# Patient Record
Sex: MALE | Race: WHITE | Employment: FULL TIME | URBAN - METROPOLITAN AREA
[De-identification: names, ages, dates, MRNs, and addresses within clinical notes are randomized per-mention and may not be internally consistent; named-entity substitution may affect disease eponyms.]

---

## 2023-01-28 ENCOUNTER — APPOINTMENT (OUTPATIENT)
Dept: GENERAL RADIOLOGY | Age: 38
End: 2023-01-28

## 2023-01-28 ENCOUNTER — APPOINTMENT (OUTPATIENT)
Dept: NON INVASIVE DIAGNOSTICS | Age: 38
DRG: 247 | End: 2023-01-28

## 2023-01-28 ENCOUNTER — HOSPITAL ENCOUNTER (EMERGENCY)
Age: 38
Discharge: ANOTHER HOSPITAL | End: 2023-01-28

## 2023-01-28 ENCOUNTER — HOSPITAL ENCOUNTER (INPATIENT)
Age: 38
LOS: 2 days | Discharge: HOME | DRG: 247 | End: 2023-01-30
Attending: INTERNAL MEDICINE | Admitting: INTERNAL MEDICINE

## 2023-01-28 VITALS
BODY MASS INDEX: 34.07 KG/M2 | WEIGHT: 230 LBS | DIASTOLIC BLOOD PRESSURE: 133 MMHG | RESPIRATION RATE: 11 BRPM | OXYGEN SATURATION: 98 % | HEIGHT: 69 IN | SYSTOLIC BLOOD PRESSURE: 189 MMHG | TEMPERATURE: 97.7 F | HEART RATE: 72 BPM

## 2023-01-28 DIAGNOSIS — I25.119 CORONARY ARTERY DISEASE INVOLVING NATIVE CORONARY ARTERY OF NATIVE HEART WITH ANGINA PECTORIS (HCC): ICD-10-CM

## 2023-01-28 DIAGNOSIS — I16.0 HYPERTENSIVE URGENCY: ICD-10-CM

## 2023-01-28 DIAGNOSIS — I21.3 STEMI (ST ELEVATION MYOCARDIAL INFARCTION) (HCC): ICD-10-CM

## 2023-01-28 DIAGNOSIS — I21.11 ST ELEVATION MYOCARDIAL INFARCTION INVOLVING RIGHT CORONARY ARTERY (HCC): Primary | ICD-10-CM

## 2023-01-28 DIAGNOSIS — E78.5 HYPERLIPIDEMIA, UNSPECIFIED HYPERLIPIDEMIA TYPE: ICD-10-CM

## 2023-01-28 DIAGNOSIS — I10 HYPERTENSION, UNSPECIFIED TYPE: Chronic | ICD-10-CM

## 2023-01-28 DIAGNOSIS — R10.31 RIGHT INGUINAL PAIN: Primary | ICD-10-CM

## 2023-01-28 PROBLEM — Z72.0 TOBACCO ABUSE: Chronic | Status: ACTIVE | Noted: 2023-01-28

## 2023-01-28 PROBLEM — I21.21 STEMI INVOLVING LEFT CIRCUMFLEX CORONARY ARTERY (HCC): Status: ACTIVE | Noted: 2023-01-28

## 2023-01-28 PROBLEM — I24.9 ACS (ACUTE CORONARY SYNDROME) (HCC): Status: ACTIVE | Noted: 2023-01-28

## 2023-01-28 LAB
ACT BLD: 383 SECS (ref 70–128)
ALBUMIN SERPL-MCNC: 4.2 G/DL (ref 3.5–5)
ALBUMIN/GLOB SERPL: 1.1 (ref 0.4–1.6)
ALP SERPL-CCNC: 90 U/L (ref 50–136)
ALT SERPL-CCNC: 78 U/L (ref 12–65)
ANION GAP SERPL CALC-SCNC: 6 MMOL/L (ref 2–11)
AST SERPL-CCNC: 31 U/L (ref 15–37)
BILIRUB SERPL-MCNC: 0.5 MG/DL (ref 0.2–1.1)
BUN SERPL-MCNC: 15 MG/DL (ref 6–23)
CALCIUM SERPL-MCNC: 9.9 MG/DL (ref 8.3–10.4)
CHLORIDE SERPL-SCNC: 102 MMOL/L (ref 101–110)
CHOLEST SERPL-MCNC: 203 MG/DL
CO2 SERPL-SCNC: 29 MMOL/L (ref 21–32)
CREAT SERPL-MCNC: 0.96 MG/DL (ref 0.8–1.5)
ECHO AO ASC DIAM: 2.8 CM
ECHO AO ASCENDING AORTA INDEX: 1.28 CM/M2
ECHO AO ROOT DIAM: 3.3 CM
ECHO AO ROOT INDEX: 1.51 CM/M2
ECHO AV AREA PEAK VELOCITY: 2.7 CM2
ECHO AV AREA VTI: 2.7 CM2
ECHO AV AREA/BSA PEAK VELOCITY: 1.2 CM2/M2
ECHO AV AREA/BSA VTI: 1.2 CM2/M2
ECHO AV MEAN GRADIENT: 4 MMHG
ECHO AV MEAN GRADIENT: 4 MMHG
ECHO AV MEAN VELOCITY: 1 M/S
ECHO AV PEAK GRADIENT: 7 MMHG
ECHO AV PEAK VELOCITY: 1.4 M/S
ECHO AV VELOCITY RATIO: 0.86
ECHO AV VTI: 29.4 CM
ECHO BSA: 2.25 M2
ECHO BSA: 2.25 M2
ECHO EST RA PRESSURE: 3 MMHG
ECHO IVC PROX: 2 CM
ECHO LA AREA 2C: 16.8 CM2
ECHO LA AREA 4C: 16.2 CM2
ECHO LA DIAMETER INDEX: 2.01 CM/M2
ECHO LA DIAMETER: 4.4 CM
ECHO LA MAJOR AXIS: 5.6 CM
ECHO LA MINOR AXIS: 5.8 CM
ECHO LA TO AORTIC ROOT RATIO: 1.33
ECHO LA VOL 2C: 39 ML (ref 18–58)
ECHO LA VOL 4C: 39 ML (ref 18–58)
ECHO LA VOL BP: 40 ML (ref 18–58)
ECHO LA VOL/BSA BIPLANE: 18 ML/M2 (ref 16–34)
ECHO LA VOLUME INDEX A2C: 18 ML/M2 (ref 16–34)
ECHO LA VOLUME INDEX A4C: 18 ML/M2 (ref 16–34)
ECHO LV E' LATERAL VELOCITY: 12 CM/S
ECHO LV E' SEPTAL VELOCITY: 11 CM/S
ECHO LV EDV A2C: 82 ML
ECHO LV EDV A4C: 80 ML
ECHO LV EDV INDEX A4C: 37 ML/M2
ECHO LV EDV NDEX A2C: 37 ML/M2
ECHO LV EJECTION FRACTION A2C: 46 %
ECHO LV EJECTION FRACTION A4C: 46 %
ECHO LV EJECTION FRACTION BIPLANE: 46 % (ref 55–100)
ECHO LV ESV A2C: 45 ML
ECHO LV ESV A4C: 43 ML
ECHO LV ESV INDEX A2C: 21 ML/M2
ECHO LV ESV INDEX A4C: 20 ML/M2
ECHO LV FRACTIONAL SHORTENING: 51 % (ref 28–44)
ECHO LV INTERNAL DIMENSION DIASTOLE INDEX: 2.33 CM/M2
ECHO LV INTERNAL DIMENSION DIASTOLIC: 5.1 CM (ref 4.2–5.9)
ECHO LV INTERNAL DIMENSION SYSTOLIC INDEX: 1.14 CM/M2
ECHO LV INTERNAL DIMENSION SYSTOLIC: 2.5 CM
ECHO LV IVSD: 1.1 CM (ref 0.6–1)
ECHO LV MASS 2D: 213.9 G (ref 88–224)
ECHO LV MASS INDEX 2D: 97.7 G/M2 (ref 49–115)
ECHO LV POSTERIOR WALL DIASTOLIC: 1.1 CM (ref 0.6–1)
ECHO LV RELATIVE WALL THICKNESS RATIO: 0.43
ECHO LVOT AREA: 3.1 CM2
ECHO LVOT AV VTI INDEX: 0.87
ECHO LVOT DIAM: 2 CM
ECHO LVOT MEAN GRADIENT: 3 MMHG
ECHO LVOT PEAK GRADIENT: 5 MMHG
ECHO LVOT PEAK VELOCITY: 1.2 M/S
ECHO LVOT STROKE VOLUME INDEX: 36.7 ML/M2
ECHO LVOT SV: 80.4 ML
ECHO LVOT VTI: 25.6 CM
ECHO MV A VELOCITY: 0.66 M/S
ECHO MV E DECELERATION TIME (DT): 178 MS
ECHO MV E VELOCITY: 0.85 M/S
ECHO MV E/A RATIO: 1.29
ECHO MV E/E' LATERAL: 7.08
ECHO MV E/E' RATIO (AVERAGED): 7.41
ECHO MV E/E' SEPTAL: 7.73
ECHO PV MAX VELOCITY: 0.9 M/S
ECHO PV PEAK GRADIENT: 3 MMHG
ECHO RIGHT VENTRICULAR SYSTOLIC PRESSURE (RVSP): 12 MMHG
ECHO RV BASAL DIMENSION: 3.9 CM
ECHO RV FREE WALL PEAK S': 13 CM/S
ECHO RV TAPSE: 2.5 CM (ref 1.7–?)
ECHO TV REGURGITANT MAX VELOCITY: 1.47 M/S
ECHO TV REGURGITANT PEAK GRADIENT: 9 MMHG
EKG ATRIAL RATE: 81 BPM
EKG DIAGNOSIS: NORMAL
EKG P AXIS: 59 DEGREES
EKG P-R INTERVAL: 160 MS
EKG Q-T INTERVAL: 368 MS
EKG QRS DURATION: 90 MS
EKG QTC CALCULATION (BAZETT): 427 MS
EKG R AXIS: 71 DEGREES
EKG T AXIS: 85 DEGREES
EKG VENTRICULAR RATE: 81 BPM
ERYTHROCYTE [DISTWIDTH] IN BLOOD BY AUTOMATED COUNT: 12.7 % (ref 11.9–14.6)
EST. AVERAGE GLUCOSE BLD GHB EST-MCNC: 100 MG/DL
GLOBULIN SER CALC-MCNC: 3.9 G/DL (ref 2.8–4.5)
GLUCOSE SERPL-MCNC: 111 MG/DL (ref 65–100)
HBA1C MFR BLD: 5.1 % (ref 4.8–5.6)
HCT VFR BLD AUTO: 51 % (ref 41.1–50.3)
HDLC SERPL-MCNC: 28 MG/DL (ref 40–60)
HDLC SERPL: 7.3
HGB BLD-MCNC: 17.4 G/DL (ref 13.6–17.2)
LDLC SERPL CALC-MCNC: 134.2 MG/DL
LV EF: 53 %
LVEF MODALITY: ABNORMAL
MAGNESIUM SERPL-MCNC: 2.3 MG/DL (ref 1.8–2.4)
MCH RBC QN AUTO: 29.5 PG (ref 26.1–32.9)
MCHC RBC AUTO-ENTMCNC: 34.1 G/DL (ref 31.4–35)
MCV RBC AUTO: 86.6 FL (ref 82–102)
NRBC # BLD: 0 K/UL (ref 0–0.2)
PLATELET # BLD AUTO: 241 K/UL (ref 150–450)
PMV BLD AUTO: 9.7 FL (ref 9.4–12.3)
POTASSIUM SERPL-SCNC: 3.4 MMOL/L (ref 3.5–5.1)
PROT SERPL-MCNC: 8.1 G/DL (ref 6.3–8.2)
RBC # BLD AUTO: 5.89 M/UL (ref 4.23–5.6)
SODIUM SERPL-SCNC: 137 MMOL/L (ref 133–143)
TRIGL SERPL-MCNC: 204 MG/DL (ref 35–150)
TROPONIN I SERPL HS-MCNC: 1428.9 PG/ML (ref 0–14)
TROPONIN I SERPL HS-MCNC: 25.9 PG/ML (ref 0–14)
TROPONIN I SERPL HS-MCNC: 5339.7 PG/ML (ref 0–14)
VLDLC SERPL CALC-MCNC: 40.8 MG/DL (ref 6–23)
WBC # BLD AUTO: 13.3 K/UL (ref 4.3–11.1)

## 2023-01-28 PROCEDURE — 6370000000 HC RX 637 (ALT 250 FOR IP): Performed by: INTERNAL MEDICINE

## 2023-01-28 PROCEDURE — 85347 COAGULATION TIME ACTIVATED: CPT

## 2023-01-28 PROCEDURE — 80061 LIPID PANEL: CPT

## 2023-01-28 PROCEDURE — C1887 CATHETER, GUIDING: HCPCS | Performed by: INTERNAL MEDICINE

## 2023-01-28 PROCEDURE — C9606 PERC D-E COR REVASC W AMI S: HCPCS | Performed by: INTERNAL MEDICINE

## 2023-01-28 PROCEDURE — 027035Z DILATION OF CORONARY ARTERY, ONE ARTERY WITH TWO DRUG-ELUTING INTRALUMINAL DEVICES, PERCUTANEOUS APPROACH: ICD-10-PCS | Performed by: INTERNAL MEDICINE

## 2023-01-28 PROCEDURE — 99285 EMERGENCY DEPT VISIT HI MDM: CPT

## 2023-01-28 PROCEDURE — 2100000000 HC CCU R&B

## 2023-01-28 PROCEDURE — 99222 1ST HOSP IP/OBS MODERATE 55: CPT | Performed by: INTERNAL MEDICINE

## 2023-01-28 PROCEDURE — 6360000002 HC RX W HCPCS: Performed by: INTERNAL MEDICINE

## 2023-01-28 PROCEDURE — 99152 MOD SED SAME PHYS/QHP 5/>YRS: CPT | Performed by: INTERNAL MEDICINE

## 2023-01-28 PROCEDURE — 96375 TX/PRO/DX INJ NEW DRUG ADDON: CPT

## 2023-01-28 PROCEDURE — 2709999900 HC NON-CHARGEABLE SUPPLY: Performed by: INTERNAL MEDICINE

## 2023-01-28 PROCEDURE — 93458 L HRT ARTERY/VENTRICLE ANGIO: CPT | Performed by: INTERNAL MEDICINE

## 2023-01-28 PROCEDURE — 6370000000 HC RX 637 (ALT 250 FOR IP)

## 2023-01-28 PROCEDURE — 6370000000 HC RX 637 (ALT 250 FOR IP): Performed by: NURSE PRACTITIONER

## 2023-01-28 PROCEDURE — 6360000002 HC RX W HCPCS: Performed by: NURSE PRACTITIONER

## 2023-01-28 PROCEDURE — C1760 CLOSURE DEV, VASC: HCPCS | Performed by: INTERNAL MEDICINE

## 2023-01-28 PROCEDURE — C1769 GUIDE WIRE: HCPCS | Performed by: INTERNAL MEDICINE

## 2023-01-28 PROCEDURE — 84484 ASSAY OF TROPONIN QUANT: CPT

## 2023-01-28 PROCEDURE — 4500000002 HC ER NO CHARGE

## 2023-01-28 PROCEDURE — 83735 ASSAY OF MAGNESIUM: CPT

## 2023-01-28 PROCEDURE — B2111ZZ FLUOROSCOPY OF MULTIPLE CORONARY ARTERIES USING LOW OSMOLAR CONTRAST: ICD-10-PCS | Performed by: INTERNAL MEDICINE

## 2023-01-28 PROCEDURE — 2580000003 HC RX 258: Performed by: INTERNAL MEDICINE

## 2023-01-28 PROCEDURE — 93005 ELECTROCARDIOGRAM TRACING: CPT | Performed by: NURSE PRACTITIONER

## 2023-01-28 PROCEDURE — 85027 COMPLETE CBC AUTOMATED: CPT

## 2023-01-28 PROCEDURE — C1874 STENT, COATED/COV W/DEL SYS: HCPCS | Performed by: INTERNAL MEDICINE

## 2023-01-28 PROCEDURE — 71045 X-RAY EXAM CHEST 1 VIEW: CPT

## 2023-01-28 PROCEDURE — 94760 N-INVAS EAR/PLS OXIMETRY 1: CPT

## 2023-01-28 PROCEDURE — 96374 THER/PROPH/DIAG INJ IV PUSH: CPT

## 2023-01-28 PROCEDURE — 6360000002 HC RX W HCPCS

## 2023-01-28 PROCEDURE — 2500000003 HC RX 250 WO HCPCS: Performed by: INTERNAL MEDICINE

## 2023-01-28 PROCEDURE — 80053 COMPREHEN METABOLIC PANEL: CPT

## 2023-01-28 PROCEDURE — 2580000003 HC RX 258: Performed by: NURSE PRACTITIONER

## 2023-01-28 PROCEDURE — 99153 MOD SED SAME PHYS/QHP EA: CPT | Performed by: INTERNAL MEDICINE

## 2023-01-28 PROCEDURE — 6360000004 HC RX CONTRAST MEDICATION: Performed by: INTERNAL MEDICINE

## 2023-01-28 PROCEDURE — C1894 INTRO/SHEATH, NON-LASER: HCPCS | Performed by: INTERNAL MEDICINE

## 2023-01-28 PROCEDURE — 93306 TTE W/DOPPLER COMPLETE: CPT | Performed by: INTERNAL MEDICINE

## 2023-01-28 PROCEDURE — 93306 TTE W/DOPPLER COMPLETE: CPT

## 2023-01-28 PROCEDURE — 83036 HEMOGLOBIN GLYCOSYLATED A1C: CPT

## 2023-01-28 PROCEDURE — 36415 COLL VENOUS BLD VENIPUNCTURE: CPT

## 2023-01-28 PROCEDURE — C1725 CATH, TRANSLUMIN NON-LASER: HCPCS | Performed by: INTERNAL MEDICINE

## 2023-01-28 PROCEDURE — 92941 PRQ TRLML REVSC TOT OCCL AMI: CPT | Performed by: INTERNAL MEDICINE

## 2023-01-28 DEVICE — IMPLANTABLE DEVICE: Type: IMPLANTABLE DEVICE | Status: FUNCTIONAL

## 2023-01-28 RX ORDER — ONDANSETRON 2 MG/ML
4 INJECTION INTRAMUSCULAR; INTRAVENOUS
Status: DISCONTINUED | OUTPATIENT
Start: 2023-01-28 | End: 2023-01-28 | Stop reason: HOSPADM

## 2023-01-28 RX ORDER — MAGNESIUM HYDROXIDE/ALUMINUM HYDROXICE/SIMETHICONE 120; 1200; 1200 MG/30ML; MG/30ML; MG/30ML
30 SUSPENSION ORAL EVERY 6 HOURS PRN
Status: DISCONTINUED | OUTPATIENT
Start: 2023-01-28 | End: 2023-01-30 | Stop reason: HOSPADM

## 2023-01-28 RX ORDER — HYDRALAZINE HYDROCHLORIDE 20 MG/ML
10 INJECTION INTRAMUSCULAR; INTRAVENOUS EVERY 6 HOURS PRN
Status: DISCONTINUED | OUTPATIENT
Start: 2023-01-28 | End: 2023-01-29

## 2023-01-28 RX ORDER — MORPHINE SULFATE 4 MG/ML
INJECTION, SOLUTION INTRAMUSCULAR; INTRAVENOUS
Status: COMPLETED
Start: 2023-01-28 | End: 2023-01-28

## 2023-01-28 RX ORDER — MORPHINE SULFATE 4 MG/ML
4 INJECTION, SOLUTION INTRAMUSCULAR; INTRAVENOUS
Status: DISCONTINUED | OUTPATIENT
Start: 2023-01-28 | End: 2023-01-30 | Stop reason: HOSPADM

## 2023-01-28 RX ORDER — ONDANSETRON HYDROCHLORIDE 4 MG/5ML
SOLUTION ORAL
Status: COMPLETED
Start: 2023-01-28 | End: 2023-01-28

## 2023-01-28 RX ORDER — NICOTINE 21 MG/24HR
1 PATCH, TRANSDERMAL 24 HOURS TRANSDERMAL DAILY
Status: DISCONTINUED | OUTPATIENT
Start: 2023-01-28 | End: 2023-01-30 | Stop reason: HOSPADM

## 2023-01-28 RX ORDER — HEPARIN SODIUM 1000 [USP'U]/ML
4000 INJECTION, SOLUTION INTRAVENOUS; SUBCUTANEOUS ONCE
Status: COMPLETED | OUTPATIENT
Start: 2023-01-28 | End: 2023-01-28

## 2023-01-28 RX ORDER — ASPIRIN 81 MG/1
81 TABLET, CHEWABLE ORAL DAILY
Status: DISCONTINUED | OUTPATIENT
Start: 2023-01-29 | End: 2023-01-30 | Stop reason: HOSPADM

## 2023-01-28 RX ORDER — NITROGLYCERIN 20 MG/100ML
INJECTION INTRAVENOUS PRN
Status: DISCONTINUED | OUTPATIENT
Start: 2023-01-28 | End: 2023-01-28 | Stop reason: HOSPADM

## 2023-01-28 RX ORDER — ASPIRIN 81 MG/1
324 TABLET, CHEWABLE ORAL ONCE
Status: DISCONTINUED | OUTPATIENT
Start: 2023-01-28 | End: 2023-01-28 | Stop reason: HOSPADM

## 2023-01-28 RX ORDER — ACETAMINOPHEN 325 MG/1
650 TABLET ORAL EVERY 6 HOURS PRN
Status: DISCONTINUED | OUTPATIENT
Start: 2023-01-28 | End: 2023-01-29

## 2023-01-28 RX ORDER — HYDROMORPHONE HCL 110MG/55ML
PATIENT CONTROLLED ANALGESIA SYRINGE INTRAVENOUS PRN
Status: DISCONTINUED | OUTPATIENT
Start: 2023-01-28 | End: 2023-01-28 | Stop reason: HOSPADM

## 2023-01-28 RX ORDER — POLYETHYLENE GLYCOL 3350 17 G/17G
17 POWDER, FOR SOLUTION ORAL DAILY PRN
Status: DISCONTINUED | OUTPATIENT
Start: 2023-01-28 | End: 2023-01-30 | Stop reason: HOSPADM

## 2023-01-28 RX ORDER — ONDANSETRON 4 MG/1
4 TABLET, ORALLY DISINTEGRATING ORAL EVERY 8 HOURS PRN
Status: DISCONTINUED | OUTPATIENT
Start: 2023-01-28 | End: 2023-01-29

## 2023-01-28 RX ORDER — MEPERIDINE HYDROCHLORIDE 50 MG/ML
INJECTION INTRAMUSCULAR; INTRAVENOUS; SUBCUTANEOUS PRN
Status: DISCONTINUED | OUTPATIENT
Start: 2023-01-28 | End: 2023-01-28 | Stop reason: HOSPADM

## 2023-01-28 RX ORDER — SODIUM CHLORIDE 9 MG/ML
INJECTION, SOLUTION INTRAVENOUS PRN
Status: DISCONTINUED | OUTPATIENT
Start: 2023-01-28 | End: 2023-01-29

## 2023-01-28 RX ORDER — SODIUM CHLORIDE 0.9 % (FLUSH) 0.9 %
5-40 SYRINGE (ML) INJECTION EVERY 12 HOURS SCHEDULED
Status: DISCONTINUED | OUTPATIENT
Start: 2023-01-28 | End: 2023-01-28 | Stop reason: HOSPADM

## 2023-01-28 RX ORDER — HEPARIN SODIUM 200 [USP'U]/100ML
INJECTION, SOLUTION INTRAVENOUS CONTINUOUS PRN
Status: DISCONTINUED | OUTPATIENT
Start: 2023-01-28 | End: 2023-01-28 | Stop reason: HOSPADM

## 2023-01-28 RX ORDER — ONDANSETRON 2 MG/ML
4 INJECTION INTRAMUSCULAR; INTRAVENOUS EVERY 6 HOURS PRN
Status: DISCONTINUED | OUTPATIENT
Start: 2023-01-28 | End: 2023-01-29

## 2023-01-28 RX ORDER — SODIUM CHLORIDE 0.9 % (FLUSH) 0.9 %
5-40 SYRINGE (ML) INJECTION PRN
Status: DISCONTINUED | OUTPATIENT
Start: 2023-01-28 | End: 2023-01-28 | Stop reason: HOSPADM

## 2023-01-28 RX ORDER — NITROGLYCERIN 0.4 MG/1
0.4 TABLET SUBLINGUAL EVERY 5 MIN PRN
Status: DISCONTINUED | OUTPATIENT
Start: 2023-01-28 | End: 2023-01-30 | Stop reason: HOSPADM

## 2023-01-28 RX ORDER — LIDOCAINE HYDROCHLORIDE 10 MG/ML
INJECTION, SOLUTION INFILTRATION; PERINEURAL PRN
Status: DISCONTINUED | OUTPATIENT
Start: 2023-01-28 | End: 2023-01-28 | Stop reason: HOSPADM

## 2023-01-28 RX ORDER — ATORVASTATIN CALCIUM 80 MG/1
80 TABLET, FILM COATED ORAL NIGHTLY
Status: DISCONTINUED | OUTPATIENT
Start: 2023-01-28 | End: 2023-01-30 | Stop reason: HOSPADM

## 2023-01-28 RX ORDER — POTASSIUM CHLORIDE 20 MEQ/1
40 TABLET, EXTENDED RELEASE ORAL PRN
Status: DISCONTINUED | OUTPATIENT
Start: 2023-01-28 | End: 2023-01-29

## 2023-01-28 RX ORDER — ASPIRIN 325 MG
TABLET ORAL
Status: COMPLETED
Start: 2023-01-28 | End: 2023-01-28

## 2023-01-28 RX ORDER — MORPHINE SULFATE 4 MG/ML
4 INJECTION, SOLUTION INTRAMUSCULAR; INTRAVENOUS
Status: COMPLETED | OUTPATIENT
Start: 2023-01-28 | End: 2023-01-28

## 2023-01-28 RX ORDER — SODIUM CHLORIDE 0.9 % (FLUSH) 0.9 %
5-40 SYRINGE (ML) INJECTION PRN
Status: DISCONTINUED | OUTPATIENT
Start: 2023-01-28 | End: 2023-01-30 | Stop reason: HOSPADM

## 2023-01-28 RX ORDER — BIVALIRUDIN 250 MG/5ML
INJECTION, POWDER, LYOPHILIZED, FOR SOLUTION INTRAVENOUS PRN
Status: DISCONTINUED | OUTPATIENT
Start: 2023-01-28 | End: 2023-01-28 | Stop reason: HOSPADM

## 2023-01-28 RX ORDER — SODIUM CHLORIDE 9 MG/ML
INJECTION, SOLUTION INTRAVENOUS CONTINUOUS
Status: DISCONTINUED | OUTPATIENT
Start: 2023-01-28 | End: 2023-01-29

## 2023-01-28 RX ORDER — POTASSIUM CHLORIDE 7.45 MG/ML
10 INJECTION INTRAVENOUS PRN
Status: DISCONTINUED | OUTPATIENT
Start: 2023-01-28 | End: 2023-01-29

## 2023-01-28 RX ORDER — METOPROLOL TARTRATE 5 MG/5ML
5 INJECTION INTRAVENOUS
Status: DISCONTINUED | OUTPATIENT
Start: 2023-01-28 | End: 2023-01-28 | Stop reason: HOSPADM

## 2023-01-28 RX ORDER — SODIUM CHLORIDE 0.9 % (FLUSH) 0.9 %
5-40 SYRINGE (ML) INJECTION EVERY 12 HOURS SCHEDULED
Status: DISCONTINUED | OUTPATIENT
Start: 2023-01-28 | End: 2023-01-30 | Stop reason: HOSPADM

## 2023-01-28 RX ORDER — MORPHINE SULFATE 2 MG/ML
2 INJECTION, SOLUTION INTRAMUSCULAR; INTRAVENOUS
Status: DISCONTINUED | OUTPATIENT
Start: 2023-01-28 | End: 2023-01-30 | Stop reason: HOSPADM

## 2023-01-28 RX ORDER — ACETAMINOPHEN 650 MG/1
650 SUPPOSITORY RECTAL EVERY 6 HOURS PRN
Status: DISCONTINUED | OUTPATIENT
Start: 2023-01-28 | End: 2023-01-29

## 2023-01-28 RX ORDER — LISINOPRIL 5 MG/1
5 TABLET ORAL DAILY
Status: DISCONTINUED | OUTPATIENT
Start: 2023-01-28 | End: 2023-01-29

## 2023-01-28 RX ORDER — POTASSIUM CHLORIDE 20 MEQ/1
40 TABLET, EXTENDED RELEASE ORAL ONCE
Status: COMPLETED | OUTPATIENT
Start: 2023-01-28 | End: 2023-01-28

## 2023-01-28 RX ORDER — NITROGLYCERIN 20 MG/100ML
5-200 INJECTION INTRAVENOUS CONTINUOUS
Status: DISCONTINUED | OUTPATIENT
Start: 2023-01-28 | End: 2023-01-29

## 2023-01-28 RX ORDER — MAGNESIUM SULFATE IN WATER 40 MG/ML
2000 INJECTION, SOLUTION INTRAVENOUS PRN
Status: DISCONTINUED | OUTPATIENT
Start: 2023-01-28 | End: 2023-01-29

## 2023-01-28 RX ORDER — HEPARIN SODIUM 5000 [USP'U]/ML
INJECTION, SOLUTION INTRAVENOUS; SUBCUTANEOUS
Status: DISCONTINUED
Start: 2023-01-28 | End: 2023-01-28

## 2023-01-28 RX ORDER — MIDAZOLAM HYDROCHLORIDE 1 MG/ML
INJECTION INTRAMUSCULAR; INTRAVENOUS PRN
Status: DISCONTINUED | OUTPATIENT
Start: 2023-01-28 | End: 2023-01-28 | Stop reason: HOSPADM

## 2023-01-28 RX ORDER — SODIUM CHLORIDE 9 MG/ML
INJECTION, SOLUTION INTRAVENOUS PRN
Status: DISCONTINUED | OUTPATIENT
Start: 2023-01-28 | End: 2023-01-28 | Stop reason: HOSPADM

## 2023-01-28 RX ADMIN — SODIUM CHLORIDE: 9 INJECTION, SOLUTION INTRAVENOUS at 08:00

## 2023-01-28 RX ADMIN — SODIUM CHLORIDE, PRESERVATIVE FREE 10 ML: 5 INJECTION INTRAVENOUS at 19:25

## 2023-01-28 RX ADMIN — METOPROLOL TARTRATE 25 MG: 25 TABLET, FILM COATED ORAL at 19:30

## 2023-01-28 RX ADMIN — SODIUM CHLORIDE, PRESERVATIVE FREE 10 ML: 5 INJECTION INTRAVENOUS at 08:54

## 2023-01-28 RX ADMIN — LISINOPRIL 5 MG: 5 TABLET ORAL at 10:18

## 2023-01-28 RX ADMIN — NITROGLYCERIN 0.4 MG: 0.4 TABLET, ORALLY DISINTEGRATING SUBLINGUAL at 11:38

## 2023-01-28 RX ADMIN — HYDRALAZINE HYDROCHLORIDE 10 MG: 20 INJECTION INTRAMUSCULAR; INTRAVENOUS at 20:52

## 2023-01-28 RX ADMIN — TICAGRELOR 90 MG: 90 TABLET ORAL at 18:43

## 2023-01-28 RX ADMIN — ONDANSETRON 4 MG: 2 INJECTION INTRAMUSCULAR; INTRAVENOUS at 19:30

## 2023-01-28 RX ADMIN — MORPHINE SULFATE 4 MG: 4 INJECTION INTRAVENOUS at 05:00

## 2023-01-28 RX ADMIN — HEPARIN SODIUM 4000 UNITS: 1000 INJECTION INTRAVENOUS; SUBCUTANEOUS at 05:01

## 2023-01-28 RX ADMIN — MORPHINE SULFATE 2 MG: 2 INJECTION, SOLUTION INTRAMUSCULAR; INTRAVENOUS at 19:30

## 2023-01-28 RX ADMIN — MORPHINE SULFATE 4 MG: 4 INJECTION, SOLUTION INTRAMUSCULAR; INTRAVENOUS at 05:00

## 2023-01-28 RX ADMIN — HYDRALAZINE HYDROCHLORIDE 10 MG: 20 INJECTION INTRAMUSCULAR; INTRAVENOUS at 11:23

## 2023-01-28 RX ADMIN — METOPROLOL TARTRATE 25 MG: 25 TABLET, FILM COATED ORAL at 08:54

## 2023-01-28 RX ADMIN — ONDANSETRON 4 MG: 4 SOLUTION ORAL at 04:59

## 2023-01-28 RX ADMIN — NITROGLYCERIN 0.5 INCH: 20 OINTMENT TOPICAL at 05:20

## 2023-01-28 RX ADMIN — BIVALIRUDIN: 250 INJECTION, POWDER, LYOPHILIZED, FOR SOLUTION INTRAVENOUS at 06:23

## 2023-01-28 RX ADMIN — ATORVASTATIN CALCIUM 80 MG: 80 TABLET, FILM COATED ORAL at 19:30

## 2023-01-28 RX ADMIN — ASPIRIN 325 MG: 325 TABLET, FILM COATED ORAL at 04:58

## 2023-01-28 RX ADMIN — POTASSIUM CHLORIDE 40 MEQ: 1500 TABLET, EXTENDED RELEASE ORAL at 08:54

## 2023-01-28 ASSESSMENT — ENCOUNTER SYMPTOMS
RESPIRATORY NEGATIVE: 1
GASTROINTESTINAL NEGATIVE: 1
EYES NEGATIVE: 1

## 2023-01-28 ASSESSMENT — PAIN SCALES - GENERAL
PAINLEVEL_OUTOF10: 8
PAINLEVEL_OUTOF10: 5
PAINLEVEL_OUTOF10: 0
PAINLEVEL_OUTOF10: 9
PAINLEVEL_OUTOF10: 9

## 2023-01-28 ASSESSMENT — PAIN DESCRIPTION - ORIENTATION: ORIENTATION: LEFT

## 2023-01-28 ASSESSMENT — PAIN DESCRIPTION - LOCATION
LOCATION: ARM;CHEST
LOCATION: CHEST
LOCATION: CHEST

## 2023-01-28 ASSESSMENT — PAIN - FUNCTIONAL ASSESSMENT: PAIN_FUNCTIONAL_ASSESSMENT: 0-10

## 2023-01-28 ASSESSMENT — PAIN DESCRIPTION - DESCRIPTORS: DESCRIPTORS: ACHING

## 2023-01-28 NOTE — PROGRESS NOTES
TRANSFER - OUT REPORT:    [facility] with Dr. Waylon Peters  Access: right femoral   2 stents to Circumflex    Unsuccessful perclose, manual pressure held for 1 hour on right femoral access site   No bleeding or hematoma site soft    MAR  6 mg versed  2 mg hydromorphone   50 mg Demoral   Angiomax bolus & gtt; Gtt turned off at 650  180 mg Brilinta      Verbal report given to [place] on [facility]  being transferred to CVICU for routine progression of patient care       Report consisted of patient's Situation, Background, Assessment and Recommendations(SBAR). Information from the following report(s) Nurse Handoff Report and MAR was reviewed with the receiving nurse. Opportunity for questions and clarification was provided.       Patient transported with:  Registered Nurse

## 2023-01-28 NOTE — ED TRIAGE NOTES
Pt CO chest pain that woke him up this morning. No cardiac hx. Pt states that the pain radiates down his L arm and his finger tips are numb. Pt advises that he has felt like he is breathing more when he's in pain. Pt took Tylenol with no relief PTA.
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
## 2023-01-28 NOTE — PROGRESS NOTES
Reviewed notes for new spiritual concerns      Will continue to assess how we can best serve this family        WITH [facility] WE PRAY FOR YOU AND YOUR FAMILY TODAY.      Per notes:       LOCAL    EX-WIFE -  SHEA    FULL CODE    NO DIRECTIVES    MODERATE RISK FALLS

## 2023-01-28 NOTE — Clinical Note
TRANSFER - IN REPORT:     Verbal report received from: EMS. Report consisted of patient's Situation, Background, Assessment and   Recommendations(SBAR). Opportunity for questions and clarification was provided. Assessment completed upon patient's arrival to unit and care assumed.

## 2023-01-28 NOTE — Clinical Note
TRANSFER - OUT REPORT:     Verbal report given to: c[facility]. Report consisted of patient's Situation, Background, Assessment and   Recommendations(SBAR). Opportunity for questions and clarification was provided. Patient transported with a Registered Nurse. Patient transported to: cvicu, 102.

## 2023-01-28 NOTE — PLAN OF CARE
Problem: Discharge Planning  Goal: Discharge to home or other facility with appropriate resources  Outcome: Progressing  Flowsheets (Taken 1/28/2023 1301)  Discharge to home or other facility with appropriate resources:   Identify barriers to discharge with patient and caregiver   Arrange for needed discharge resources and transportation as appropriate   Identify discharge learning needs (meds, wound care, etc)   Refer to discharge planning if patient needs post-hospital services based on physician order or complex needs related to functional status, cognitive ability or social support system     Problem: Safety - Adult  Goal: Free from fall injury  Outcome: Progressing
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
## 2023-01-29 PROBLEM — E78.5 HLD (HYPERLIPIDEMIA): Status: ACTIVE | Noted: 2023-01-29

## 2023-01-29 PROBLEM — I25.10 CAD (CORONARY ARTERY DISEASE): Status: ACTIVE | Noted: 2023-01-29

## 2023-01-29 PROBLEM — I10 HTN (HYPERTENSION): Chronic | Status: ACTIVE | Noted: 2023-01-29

## 2023-01-29 PROBLEM — E66.9 CLASS 1 OBESITY IN ADULT: Chronic | Status: ACTIVE | Noted: 2023-01-29

## 2023-01-29 LAB
ERYTHROCYTE [DISTWIDTH] IN BLOOD BY AUTOMATED COUNT: 13.2 % (ref 11.9–14.6)
HCT VFR BLD AUTO: 48.8 % (ref 41.1–50.3)
HGB BLD-MCNC: 16.8 G/DL (ref 13.6–17.2)
MCH RBC QN AUTO: 29.9 PG (ref 26.1–32.9)
MCHC RBC AUTO-ENTMCNC: 34.4 G/DL (ref 31.4–35)
MCV RBC AUTO: 86.8 FL (ref 82–102)
NRBC # BLD: 0 K/UL (ref 0–0.2)
PLATELET # BLD AUTO: 260 K/UL (ref 150–450)
PMV BLD AUTO: 9.9 FL (ref 9.4–12.3)
RBC # BLD AUTO: 5.62 M/UL (ref 4.23–5.6)
WBC # BLD AUTO: 17.1 K/UL (ref 4.3–11.1)

## 2023-01-29 PROCEDURE — 2580000003 HC RX 258: Performed by: NURSE PRACTITIONER

## 2023-01-29 PROCEDURE — 6360000002 HC RX W HCPCS: Performed by: NURSE PRACTITIONER

## 2023-01-29 PROCEDURE — 6370000000 HC RX 637 (ALT 250 FOR IP): Performed by: NURSE PRACTITIONER

## 2023-01-29 PROCEDURE — 36415 COLL VENOUS BLD VENIPUNCTURE: CPT

## 2023-01-29 PROCEDURE — 99232 SBSQ HOSP IP/OBS MODERATE 35: CPT | Performed by: INTERNAL MEDICINE

## 2023-01-29 PROCEDURE — 6370000000 HC RX 637 (ALT 250 FOR IP): Performed by: INTERNAL MEDICINE

## 2023-01-29 PROCEDURE — 85027 COMPLETE CBC AUTOMATED: CPT

## 2023-01-29 PROCEDURE — 2140000001 HC CVICU INTERMEDIATE R&B

## 2023-01-29 RX ORDER — ACETAMINOPHEN 325 MG/1
650 TABLET ORAL EVERY 4 HOURS PRN
Status: DISCONTINUED | OUTPATIENT
Start: 2023-01-29 | End: 2023-01-30 | Stop reason: HOSPADM

## 2023-01-29 RX ORDER — LISINOPRIL 5 MG/1
10 TABLET ORAL DAILY
Status: DISCONTINUED | OUTPATIENT
Start: 2023-01-30 | End: 2023-01-30 | Stop reason: HOSPADM

## 2023-01-29 RX ORDER — METOPROLOL SUCCINATE 100 MG/1
100 TABLET, EXTENDED RELEASE ORAL DAILY
Status: DISCONTINUED | OUTPATIENT
Start: 2023-01-29 | End: 2023-01-30 | Stop reason: HOSPADM

## 2023-01-29 RX ADMIN — SODIUM CHLORIDE, PRESERVATIVE FREE 10 ML: 5 INJECTION INTRAVENOUS at 21:58

## 2023-01-29 RX ADMIN — ATORVASTATIN CALCIUM 80 MG: 80 TABLET, FILM COATED ORAL at 21:57

## 2023-01-29 RX ADMIN — LISINOPRIL 5 MG: 5 TABLET ORAL at 09:21

## 2023-01-29 RX ADMIN — ACETAMINOPHEN 650 MG: 325 TABLET ORAL at 21:56

## 2023-01-29 RX ADMIN — METOPROLOL TARTRATE 25 MG: 25 TABLET, FILM COATED ORAL at 09:21

## 2023-01-29 RX ADMIN — TICAGRELOR 90 MG: 90 TABLET ORAL at 21:57

## 2023-01-29 RX ADMIN — MORPHINE SULFATE 2 MG: 2 INJECTION, SOLUTION INTRAMUSCULAR; INTRAVENOUS at 01:54

## 2023-01-29 RX ADMIN — SODIUM CHLORIDE, PRESERVATIVE FREE 10 ML: 5 INJECTION INTRAVENOUS at 09:21

## 2023-01-29 RX ADMIN — TICAGRELOR 90 MG: 90 TABLET ORAL at 09:21

## 2023-01-29 RX ADMIN — ASPIRIN 81 MG: 81 TABLET, CHEWABLE ORAL at 09:20

## 2023-01-29 ASSESSMENT — PAIN SCALES - GENERAL
PAINLEVEL_OUTOF10: 5
PAINLEVEL_OUTOF10: 2
PAINLEVEL_OUTOF10: 2
PAINLEVEL_OUTOF10: 3
PAINLEVEL_OUTOF10: 0

## 2023-01-29 ASSESSMENT — PAIN DESCRIPTION - DESCRIPTORS
DESCRIPTORS: ACHING
DESCRIPTORS: SORE

## 2023-01-29 ASSESSMENT — PAIN DESCRIPTION - ORIENTATION
ORIENTATION: RIGHT
ORIENTATION: LEFT

## 2023-01-29 ASSESSMENT — PAIN DESCRIPTION - LOCATION
LOCATION: ARM;SHOULDER
LOCATION: GROIN

## 2023-01-29 NOTE — PROGRESS NOTES
[facility] CARDIOLOGY PROGRESS NOTE           1/29/2023 9:00 AM    Admit Date: 1/28/2023      Subjective:   No cp or sob    Objective:      Vitals:    01/29/23 0224 01/29/23 0400 01/29/23 0500 01/29/23 0740   BP:  116/67 126/69    Pulse:  88 84 88   Resp: 22 14 10 23   Temp:    98.4 °F (36.9 °C)   TempSrc:    Oral   SpO2:  95%  92%   Weight:       Height:           Physical Exam:  General-No Acute Distress  Neck- supple, no JVD  CV- regular rate and rhythm no MRG  Lung- clear bilaterally  Abd- soft, nontender, nondistended  Ext- no edema bilaterally.The right groin is tender no hematoma or bruit  Skin- warm and dry    Data Review:   Recent Labs     01/28/23  0443 01/28/23  0831 01/29/23  0333     --   --    K 3.4*  --   --    MG  --  2.3  --    BUN 15  --   --    WBC 13.3*  --  17.1*   HGB 17.4*  --  16.8   HCT 51.0*  --  48.8     --  260   CHOL  --  203*  --    HDL  --  28*  --        Assessment/Plan:     S/p  lcx stemi  ////  Transfer to the floor  Adjust meds  Inspect groin again tomorrow for fear of a possible PSA          MARIE BOO MD  1/29/2023 9:00 AM

## 2023-01-30 ENCOUNTER — APPOINTMENT (OUTPATIENT)
Dept: ULTRASOUND IMAGING | Age: 38
DRG: 247 | End: 2023-01-30

## 2023-01-30 VITALS
HEART RATE: 79 BPM | SYSTOLIC BLOOD PRESSURE: 123 MMHG | TEMPERATURE: 98.9 F | WEIGHT: 230 LBS | BODY MASS INDEX: 34.07 KG/M2 | DIASTOLIC BLOOD PRESSURE: 60 MMHG | OXYGEN SATURATION: 96 % | HEIGHT: 69 IN | RESPIRATION RATE: 18 BRPM

## 2023-01-30 PROBLEM — I21.21 STEMI INVOLVING LEFT CIRCUMFLEX CORONARY ARTERY (HCC): Status: RESOLVED | Noted: 2023-01-28 | Resolved: 2023-01-30

## 2023-01-30 PROBLEM — R10.31 RIGHT INGUINAL PAIN: Status: ACTIVE | Noted: 2023-01-30

## 2023-01-30 PROBLEM — I16.0 HYPERTENSIVE URGENCY: Status: RESOLVED | Noted: 2023-01-28 | Resolved: 2023-01-30

## 2023-01-30 PROBLEM — I24.9 ACS (ACUTE CORONARY SYNDROME) (HCC): Status: RESOLVED | Noted: 2023-01-28 | Resolved: 2023-01-30

## 2023-01-30 LAB
ANION GAP SERPL CALC-SCNC: 9 MMOL/L (ref 2–11)
BASOPHILS # BLD: 0.1 K/UL (ref 0–0.2)
BASOPHILS NFR BLD: 1 % (ref 0–2)
BUN SERPL-MCNC: 15 MG/DL (ref 6–23)
CALCIUM SERPL-MCNC: 9.8 MG/DL (ref 8.3–10.4)
CHLORIDE SERPL-SCNC: 106 MMOL/L (ref 101–110)
CO2 SERPL-SCNC: 22 MMOL/L (ref 21–32)
CREAT SERPL-MCNC: 1 MG/DL (ref 0.8–1.5)
DIFFERENTIAL METHOD BLD: ABNORMAL
EKG ATRIAL RATE: 93 BPM
EKG DIAGNOSIS: NORMAL
EKG P AXIS: 46 DEGREES
EKG P-R INTERVAL: 162 MS
EKG Q-T INTERVAL: 356 MS
EKG QRS DURATION: 90 MS
EKG QTC CALCULATION (BAZETT): 442 MS
EKG R AXIS: 8 DEGREES
EKG T AXIS: 35 DEGREES
EKG VENTRICULAR RATE: 93 BPM
EOSINOPHIL # BLD: 0.3 K/UL (ref 0–0.8)
EOSINOPHIL NFR BLD: 2 % (ref 0.5–7.8)
ERYTHROCYTE [DISTWIDTH] IN BLOOD BY AUTOMATED COUNT: 12.9 % (ref 11.9–14.6)
GLUCOSE SERPL-MCNC: 91 MG/DL (ref 65–100)
HCT VFR BLD AUTO: 48.5 % (ref 41.1–50.3)
HGB BLD-MCNC: 16.3 G/DL (ref 13.6–17.2)
IMM GRANULOCYTES # BLD AUTO: 0.1 K/UL (ref 0–0.5)
IMM GRANULOCYTES NFR BLD AUTO: 1 % (ref 0–5)
LYMPHOCYTES # BLD: 2.1 K/UL (ref 0.5–4.6)
LYMPHOCYTES NFR BLD: 13 % (ref 13–44)
MCH RBC QN AUTO: 29.7 PG (ref 26.1–32.9)
MCHC RBC AUTO-ENTMCNC: 33.6 G/DL (ref 31.4–35)
MCV RBC AUTO: 88.3 FL (ref 82–102)
MONOCYTES # BLD: 2 K/UL (ref 0.1–1.3)
MONOCYTES NFR BLD: 12 % (ref 4–12)
NEUTS SEG # BLD: 11.4 K/UL (ref 1.7–8.2)
NEUTS SEG NFR BLD: 72 % (ref 43–78)
NRBC # BLD: 0 K/UL (ref 0–0.2)
PLATELET # BLD AUTO: 210 K/UL (ref 150–450)
PMV BLD AUTO: 9.6 FL (ref 9.4–12.3)
POTASSIUM SERPL-SCNC: 4.6 MMOL/L (ref 3.5–5.1)
RBC # BLD AUTO: 5.49 M/UL (ref 4.23–5.6)
SODIUM SERPL-SCNC: 137 MMOL/L (ref 133–143)
WBC # BLD AUTO: 15.9 K/UL (ref 4.3–11.1)

## 2023-01-30 PROCEDURE — 36415 COLL VENOUS BLD VENIPUNCTURE: CPT

## 2023-01-30 PROCEDURE — 99238 HOSP IP/OBS DSCHRG MGMT 30/<: CPT | Performed by: INTERNAL MEDICINE

## 2023-01-30 PROCEDURE — 6370000000 HC RX 637 (ALT 250 FOR IP): Performed by: INTERNAL MEDICINE

## 2023-01-30 PROCEDURE — 2580000003 HC RX 258: Performed by: NURSE PRACTITIONER

## 2023-01-30 PROCEDURE — 85025 COMPLETE CBC W/AUTO DIFF WBC: CPT

## 2023-01-30 PROCEDURE — 80048 BASIC METABOLIC PNL TOTAL CA: CPT

## 2023-01-30 PROCEDURE — 6370000000 HC RX 637 (ALT 250 FOR IP): Performed by: NURSE PRACTITIONER

## 2023-01-30 PROCEDURE — 93976 VASCULAR STUDY: CPT

## 2023-01-30 PROCEDURE — 93005 ELECTROCARDIOGRAM TRACING: CPT | Performed by: NURSE PRACTITIONER

## 2023-01-30 RX ORDER — METOPROLOL SUCCINATE 100 MG/1
100 TABLET, EXTENDED RELEASE ORAL DAILY
Qty: 30 TABLET | Refills: 3 | Status: CANCELLED | OUTPATIENT
Start: 2023-01-30

## 2023-01-30 RX ORDER — ASPIRIN 81 MG/1
81 TABLET, CHEWABLE ORAL DAILY
Qty: 30 TABLET | Refills: 3 | Status: CANCELLED | OUTPATIENT
Start: 2023-01-30

## 2023-01-30 RX ORDER — LISINOPRIL 10 MG/1
10 TABLET ORAL DAILY
Qty: 30 TABLET | Refills: 3 | Status: SHIPPED | OUTPATIENT
Start: 2023-01-30

## 2023-01-30 RX ORDER — ASPIRIN 81 MG/1
81 TABLET, CHEWABLE ORAL DAILY
Qty: 30 TABLET | Refills: 3 | Status: SHIPPED | OUTPATIENT
Start: 2023-01-30

## 2023-01-30 RX ORDER — NITROGLYCERIN 0.4 MG/1
0.4 TABLET SUBLINGUAL EVERY 5 MIN PRN
Qty: 25 TABLET | Refills: 3 | Status: SHIPPED | OUTPATIENT
Start: 2023-01-30

## 2023-01-30 RX ORDER — ATORVASTATIN CALCIUM 80 MG/1
80 TABLET, FILM COATED ORAL NIGHTLY
Qty: 30 TABLET | Refills: 3 | Status: CANCELLED | OUTPATIENT
Start: 2023-01-30

## 2023-01-30 RX ORDER — LISINOPRIL 10 MG/1
10 TABLET ORAL DAILY
Qty: 30 TABLET | Refills: 3 | Status: CANCELLED | OUTPATIENT
Start: 2023-01-30

## 2023-01-30 RX ORDER — METOPROLOL SUCCINATE 100 MG/1
100 TABLET, EXTENDED RELEASE ORAL DAILY
Qty: 30 TABLET | Refills: 3 | Status: SHIPPED | OUTPATIENT
Start: 2023-01-30

## 2023-01-30 RX ORDER — ATORVASTATIN CALCIUM 80 MG/1
80 TABLET, FILM COATED ORAL NIGHTLY
Qty: 30 TABLET | Refills: 3 | Status: SHIPPED | OUTPATIENT
Start: 2023-01-30

## 2023-01-30 RX ORDER — NITROGLYCERIN 0.4 MG/1
0.4 TABLET SUBLINGUAL EVERY 5 MIN PRN
Qty: 25 TABLET | Refills: 3 | Status: CANCELLED | OUTPATIENT
Start: 2023-01-30

## 2023-01-30 RX ADMIN — METOPROLOL SUCCINATE 100 MG: 100 TABLET, EXTENDED RELEASE ORAL at 09:35

## 2023-01-30 RX ADMIN — SODIUM CHLORIDE, PRESERVATIVE FREE 10 ML: 5 INJECTION INTRAVENOUS at 09:39

## 2023-01-30 RX ADMIN — TICAGRELOR 90 MG: 90 TABLET ORAL at 09:34

## 2023-01-30 RX ADMIN — LISINOPRIL 10 MG: 5 TABLET ORAL at 09:34

## 2023-01-30 RX ADMIN — ASPIRIN 81 MG: 81 TABLET, CHEWABLE ORAL at 09:34

## 2023-01-30 ASSESSMENT — PAIN SCALES - GENERAL: PAINLEVEL_OUTOF10: 0
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
## 2023-01-30 NOTE — PLAN OF CARE
Problem: Discharge Planning  Goal: Discharge to home or other facility with appropriate resources  Outcome: Completed  Flowsheets (Taken 1/30/2023 4974)  Discharge to home or other facility with appropriate resources:   Identify barriers to discharge with patient and caregiver   Arrange for needed discharge resources and transportation as appropriate   Identify discharge learning needs (meds, wound care, etc)   Arrange for interpreters to assist at discharge as needed   Refer to discharge planning if patient needs post-hospital services based on physician order or complex needs related to functional status, cognitive ability or social support system     Problem: Safety - Adult  Goal: Free from fall injury  Outcome: Completed  Flowsheets (Taken 1/30/2023 6588)  Free From Fall Injury: Instruct family/caregiver on patient safety     Problem: Pain  Goal: Verbalizes/displays adequate comfort level or baseline comfort level  Outcome: Completed
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
## 2023-01-30 NOTE — PROGRESS NOTES
Discharge instructions reviewed with patient. Prescriptions given for brilinta, aspirin, lipitor, lisinopril, metoprolol, nitroglycerin and med info sheets provided for all new medications. Opportunity for questions provided. Patient voiced understanding of all discharge instructions.

## 2023-01-30 NOTE — DISCHARGE INSTRUCTIONS
Learning About How the Heart Works  What does your heart do? Your heart pumps blood to the rest of your body through blood vessels. Blood carries oxygen and other important nutrients that your body needs to stay healthy and to work properly. Your heart is a muscle with four chambers, and valves between each chamber. The chambers on the right side of your heart receive blood without oxygen in it from your veins and pump it to your lungs. In your lungs, the blood picks up oxygen and gets rid of carbon dioxide. The chambers on the left side of your heart receive oxygen-rich blood from your lungs and pump it to the rest of your body. Your heart has its own electrical system that controls how fast and regular your heart beats. The electrical system sends signals to the heart chambers to contract (pump blood out) and relax in a set rhythm. What problems can happen with your heart? Problems with the heart may include:  Heart failure. This means that your heart is not pumping as well as it should. Coronary artery disease (CAD). CAD happens when fats build up in the arteries that bring oxygen-rich blood to your heart. The buildup reduces the amount of blood that gets to your heart. It can cause angina symptoms such as chest pain or pressure. It can lead to a heart attack. Heart rhythm and heart rate problems. Your heart may beat in an irregular pattern or too fast or too slow. Heart valve problems. Blood may leak through a valve or have trouble getting through a valve. How can you keep your heart healthy? Eat heart-healthy foods. These foods include vegetables, fruits, nuts, beans, lean meat, fish, and whole grains. Limit things that are not so good for your heart, like sodium, alcohol, and sugar. Be active. Talk to your doctor before you start an exercise program. Your doctor can help you know what amount and level of activity is safe for you.  Get at least 30 minutes of exercise on most days of the week.  If you smoke, quit. It may be the best thing you can do to prevent heart disease. If you need help quitting, talk to your doctor about stop-smoking programs and medicines. These can increase your chances of quitting for good. Stay at a healthy weight. Lose weight if you need to. Manage other health problems such as diabetes, high blood pressure, and high cholesterol. If you think you may have a problem with alcohol or drug use, talk to your doctor. Where can you learn more? Go to http://[employer].woods.com/ and enter Z142 to learn more about \"Learning About How the Heart Works. \"  Current as of: September 7, 2022               Content Version: 13.5  © 2006-2022 [employer]. Care instructions adapted under license by [facility] ([facility]). If you have questions about a medical condition or this instruction, always ask your healthcare professional. [place] [address] any warranty or liability for your use of this information. Learning About Coronary Angioplasty  What is a coronary angioplasty? Coronary angioplasty is a procedure that uses a thin tube called a catheter to open a blocked or narrowed coronary artery. Coronary arteries are the blood vessels that bring oxygen to the heart muscle. Angioplasty also may be called percutaneous coronary intervention (PCI). Angioplasty can widen an artery that has been narrowed by fatty buildup (plaque) or blocked by a blood clot. The procedure helps blood flow more normally to the heart muscle. How is the procedure done? Coronary angioplasty is done in a cardiac catheterization laboratory (\"cath lab\"). It is done by a heart specialist called a cardiologist. The whole procedure may take 1½ to 3 hours. You lie on a table under a large X-ray machine. You will get medicine through an IV in one of your veins. It helps you relax and not feel pain. You will be awake during the procedure.  But you may not be able to remember much about it. The doctor will inject some medicine into your wrist or groin to numb the skin. You will feel a small needle poke you. It's like having a blood test. You may feel some pressure when the doctor puts in the catheter. But you will not feel pain. The doctor will look at X-ray pictures on a monitor (like a TV screen) to move the catheter to your heart. The doctor then puts a dye into the catheter. This makes your heart's arteries show up on a screen. The doctor can then see any arteries that are blocked or narrowed. You may feel warm or flushed for a short time when the doctor injects dye into your artery. If you have a blocked or narrow artery, the doctor uses a catheter with a tiny balloon at the tip. The doctor puts it into the blocked or narrow area and inflates it. The balloon presses the fatty buildup against the walls of the artery. This buildup is called plaque. This creates more room for blood to flow. In most cases, the doctor then puts a stent in the artery. A stent is a small, expandable tube. It presses against the walls of the artery. The stent is left in the artery to keep the artery open. This helps blood flow. The catheter is removed from your body. What happens right after the procedure? The catheter will be removed. Pressure may be applied to the area where the catheter was put into your blood vessel. This will help prevent bleeding. A small device may also be used to close the blood vessel. You may have a bandage or a compression device on the catheter site. After the procedure, you will be taken to a room where the catheter site and your heart rate, blood pressure, and temperature will be checked several times. If the catheter was put in your groin, you will need to lie still and keep your leg straight for up to a few hours. If the catheter was put in your wrist, you may need to keep your arm still for at least 2 hours. You may go home the same day.  Or you may stay at least 1 night in the hospital. When you go home, you will get instructions from your doctor to help you recover well and prevent problems. Make sure to drink plenty of fluids (unless your doctor tells you not to) for several hours after the procedure. This will help flush the dye out of your body. Follow-up care is a key part of your treatment and safety. Be sure to make and go to all appointments, and call your doctor if you are having problems. It's also a good idea to know your test results and keep a list of the medicines you take. Where can you learn more? Go to http://www.woods.com/ and enter M058 to learn more about \"Learning About Coronary Angioplasty. \"  Current as of: September 7, 2022               Content Version: 13.5  © 2006-2022 [employer]. Care instructions adapted under license by [facility] ([facility]). If you have questions about a medical condition or this instruction, always ask your healthcare professional. [place] [address] any warranty or liability for your use of this information. Reducing Risk of Another Heart Attack With Medicine: Care Instructions  Your Care Instructions     After a heart attack, medicines help lower your risk of having another one. These medicines include:  ACE inhibitors or ARBs. These are types of blood pressure medicines. Statins and other cholesterol medicines. These lower cholesterol. Aspirin and other antiplatelets. These medicines prevent blood clots from forming in your blood vessels. This can help prevent a heart attack. Beta-blocker medicines. These are a type of blood pressure and heart medicine. All medicines can cause side effects. So it is important to understand the pros and cons of any medicine you take. It is also important to take your medicines exactly as your doctor tells you to. Follow-up care is a key part of your treatment and safety.  Be sure to make and go to all appointments, and call your doctor if you are having problems. It's also a good idea to know your test results and keep a list of the medicines you take.  ACE inhibitors  ACE (angiotensin-converting enzyme) inhibitors are used for three main reasons. They lower blood pressure. They protect the kidneys. And they prevent heart attacks and strokes. Examples include:  Benazepril (Lotensin).  Lisinopril (Prinivil).  Ramipril (Altace).  An angiotensin II receptor blocker (ARB) may be used instead of an ACE inhibitor. ARBs help you in the same ways as ACE inhibitors. Examples include:  Candesartan (Atacand).  Irbesartan (Avapro).  Losartan (Cozaar).  Before you start taking an ACE inhibitor or an ARB, make sure your doctor knows if you:  Take water pills (diuretics).  Take potassium pills or use salt substitutes.  Are pregnant or breastfeeding.  Had a kidney transplant or other kidney problems.  ACE inhibitors and ARBs can cause side effects. Call your doctor right away if you have:  Trouble breathing.  Swelling in your face, head, neck, or tongue.  Statins  Statins can help lower your risk for a heart attack and stroke. This medicine lowers your cholesterol. Examples include:  Atorvastatin (Lipitor).  Pravastatin (Pravachol).  Simvastatin (Zocor).  Before you start taking a statin, talk to your doctor. Make sure your doctor knows if:  You have had a kidney transplant or other kidney problems.  You have liver disease.  You take any other prescription medicine, over-the-counter medicine, vitamins, supplements, or herbal remedies.  You are pregnant or breastfeeding.  Statins can cause side effects. Call your doctor if you have side effects that bother you. There may be different statins you can try. Work with your doctor to find the right statin and amount for you.  Aspirin  After a heart attack, aspirin can help lower your risk of having another one. Most heart attacks are caused by a blood clot that blocks a coronary artery. When this happens, oxygen can't get to the  heart muscle, and part of the heart dies. Aspirin can help prevent blood clots that can block the blood vessels. You may not be able to use aspirin if you:  Have asthma or certain other health conditions. Have an ulcer or other stomach problem. Take some other medicine (called a blood thinner) that prevents blood clots. Are allergic to aspirin. Your doctor may recommend that you take one low-dose aspirin (81 mg) tablet each day, with a meal and a full glass of water. Aspirin can also cause serious bleeding. Be sure you get instructions about how to take aspirin safely. Call your doctor right away if you have:  Unusual bleeding. Nausea, vomiting, or heartburn. Black or bloody stools. Beta-blockers  Beta-blockers are used for three main reasons. They lower blood pressure. They relieve angina symptoms (such as chest pain or pressure). And they may reduce the chances of a second heart attack. They include: Atenolol (Tenormin). Carvedilol (Coreg). Metoprolol (Lopressor). Before you start taking a beta-blocker, make sure your doctor knows if you have:  Severe asthma or frequent asthma attacks. A very slow pulse. (This is less than 55 beats a minute.)  Beta-blockers can cause side effects. Call your doctor if you:  Wheeze or have trouble breathing. Feel dizzy or lightheaded. Have asthma that gets worse. When should you call for help? Watch closely for changes in your health, and be sure to contact your doctor if you have any problems. Where can you learn more? Go to http://www.woods.com/ and enter R428 to learn more about \"Reducing Risk of Another Heart Attack With Medicine: Care Instructions. \"  Current as of: September 7, 2022               Content Version: 13.5  © 8392-3880 [employer]. Care instructions adapted under license by [facility] [employer] [facility] ([facility]).  If you have questions about a medical condition or this instruction, always ask your healthcare professional. Catherine Russ [facility] disclaims any warranty or liability for your use of this information.    The patient is being discharged home in stable condition on a low saturated fat, low cholesterol and low salt diet. The patient is instructed to advance activities as tolerated to the limit of fatigue or shortness of breath. The patient is instructed to avoid all heavy lifting, straining, stooping or squatting for 3-5 days. The patient is instructed to watch the cath site for bleeding/oozing; if seen, the patient is instructed to apply firm pressure with a clean cloth and call [facility] at [phone]. The patient is instructed to watch for signs of infection which include: increasing area of redness, fever/hot to touch or purulent drainage at the catheterization site. The patient is instructed not to soak in a bathtub for 7-10 days, but is cleared to shower. The patient is instructed to call the office or return to the ER for immediate evaluation for any shortness of breath or chest pain not relieved by NTG.

## 2023-01-30 NOTE — PROGRESS NOTES
S[facility] 2 CV STEPDOWN  [address] [place] [address]  Phone: [phone]             January 30, 2023    Patient: Jan Olivier   YOB: 1985   Date of Visit: 1/28/2023       To Whom It May Concern:    Jan Olivier was seen and treated in our facility  beginning 1/28/2023 until 1/30/2023. He may return to work on 2/7/2023 per Maged Quick NP .       Sincerely,       Sabra Keating RN         Signature:__________________________________

## 2023-01-30 NOTE — PROGRESS NOTES
Cardiac Rehab: Spoke with patient regarding referral to cardiac rehab. Patient meets admission criteria based on STEMI wiith PCI (1/28/23). Written information about Cardiac Rehab given and reviewed with patient. Discussed lifestyle modifications to promote cardiac wellness. Patient indicated that that he can not  participate in the cardiac rehab program due to cost of the program without insurance. He has an application to the [address] [place] [address] financial assistance program to pursue if he wishes. He requests to speak with a representative from the [address] [place] [address] concerning potential benefits to cover the cost of Cardiac Rehab. I will forward his name and phone number to the [place] [facility] as requested. His Cardiologist is Dr. Edyta Brigsg.       Thank you,  FERN Hung, RN  Cardiopulmonary Rehabilitation Nurse Liaison  [facility]

## 2023-01-31 ENCOUNTER — TELEPHONE (OUTPATIENT)
Dept: CARDIOLOGY CLINIC | Age: 38
End: 2023-01-31

## 2023-01-31 NOTE — TELEPHONE ENCOUNTER
TRANSITIONS OF CARE CALL    Admit: 1/28/23  Discharge: 1/30/23  DX: STEMI  TC Appt: 2/6/23 @ 2:15 w/ Dr. Marylin Elizabeth    Discharge Summary & DC medications reviewed with the pt. Pt states Cath site is bruised but no swelling or drainage. Medications reviewed-States he has started ALL medications prescribed at discharge. I explained the importance of taking Brilinta & ASA without missing any doses. NTG directions  & usage also discussed with pt. Pt states he has no questions or concerns.  TC appt confirmed with pt. 2/6/23 @ 2:15 with Dr Marylin Elizabeth

## 2023-02-06 ENCOUNTER — OFFICE VISIT (OUTPATIENT)
Dept: CARDIOLOGY CLINIC | Age: 38
End: 2023-02-06

## 2023-02-06 VITALS
DIASTOLIC BLOOD PRESSURE: 60 MMHG | BODY MASS INDEX: 32.44 KG/M2 | HEIGHT: 69 IN | SYSTOLIC BLOOD PRESSURE: 100 MMHG | WEIGHT: 219 LBS | HEART RATE: 60 BPM

## 2023-02-06 DIAGNOSIS — I25.10 ASCVD (ARTERIOSCLEROTIC CARDIOVASCULAR DISEASE): Primary | ICD-10-CM

## 2023-02-06 PROCEDURE — 3074F SYST BP LT 130 MM HG: CPT | Performed by: INTERNAL MEDICINE

## 2023-02-06 PROCEDURE — 99214 OFFICE O/P EST MOD 30 MIN: CPT | Performed by: INTERNAL MEDICINE

## 2023-02-06 PROCEDURE — 3078F DIAST BP <80 MM HG: CPT | Performed by: INTERNAL MEDICINE

## 2023-02-06 RX ORDER — CLOPIDOGREL BISULFATE 75 MG/1
TABLET ORAL
Qty: 90 TABLET | Refills: 3 | Status: SHIPPED | OUTPATIENT
Start: 2023-02-06

## 2023-02-06 NOTE — PROGRESS NOTES
[facility] CARDIOLOGY  [address], [address]  [address] [facility], [address] [place] [address]  PHONE: [phone]        23        NAME:  Margareth Ruiz  : 1985  MRN: [id number]     CHIEF COMPLAINT:    Coronary Artery Disease      SUBJECTIVE:     Home 1 week post stemi. Doing well. No chest pain or palpitation or dizziness. Brilinta too expensive. Medications were all reviewed with the patient today and updated as necessary. Current Outpatient Medications   Medication Sig    clopidogrel (PLAVIX) 75 MG tablet The day after discontinuation of Brilinta, take 4 clopidogrel tabs once (300 mg) then 1 tab q day thereafter. nitroGLYCERIN (NITROSTAT) 0.4 MG SL tablet Place 1 tablet under the tongue every 5 minutes as needed for Chest pain up to max of 3 total doses. If no relief after 1 dose, call 911.    atorvastatin (LIPITOR) 80 MG tablet Take 1 tablet by mouth nightly    lisinopril (PRINIVIL;ZESTRIL) 10 MG tablet Take 1 tablet by mouth daily    aspirin 81 MG chewable tablet Take 1 tablet by mouth daily    metoprolol succinate (TOPROL XL) 100 MG extended release tablet Take 1 tablet by mouth daily     No current facility-administered medications for this visit. No Known Allergies        PHYSICAL EXAM:     Wt Readings from Last 3 Encounters:   23 219 lb (99.3 kg)   23 230 lb (104.3 kg)   23 230 lb (104.3 kg)     BP Readings from Last 3 Encounters:   23 100/60   23 123/60   23 (!) 189/133       /60   Pulse 60   Ht 5' 9\" (1.753 m)   Wt 219 lb (99.3 kg)   BMI 32.34 kg/m²     Physical Exam  Vitals reviewed. HENT:      Head: Normocephalic and atraumatic. Eyes:      Extraocular Movements: Extraocular movements intact. Pupils: Pupils are equal, round, and reactive to light. Cardiovascular:      Rate and Rhythm: Normal rate. Heart sounds: Normal heart sounds.    Pulmonary:      Effort: Pulmonary effort is normal.      Breath sounds: Normal breath sounds. Abdominal:      General: Abdomen is flat. Palpations: Abdomen is soft. There is no mass. Musculoskeletal:         General: Normal range of motion. Cervical back: Normal range of motion. Skin:     General: Skin is warm and dry. Neurological:      General: No focal deficit present. Mental Status: He is alert and oriented to person, place, and time. Psychiatric:         Mood and Affect: Mood normal.         RECENT LABS AND RECORDS REVIEW          ASSESSMENT and Joshua Shaw was seen today for coronary artery disease. Diagnoses and all orders for this visit:    ASCVD (arteriosclerotic cardiovascular disease)    Other orders  -     clopidogrel (PLAVIX) 75 MG tablet; The day after discontinuation of Brilinta, take 4 clopidogrel tabs once (300 mg) then 1 tab q day thereafter. CV status is stable. Medications and most recent labs reviewed. Diet and exercise and smoking cessation are encouraged. Greater  than 50% of today's visit was devoted to counseling the patient, explaining disease concepts and offering advice and suggestions for optimal care. Return in about 3 months (around 5/6/2023).        Maritza Sam MD  2/6/2023  2:51 PM

## 2023-02-07 ENCOUNTER — TELEPHONE (OUTPATIENT)
Dept: CARDIOLOGY CLINIC | Age: 38
End: 2023-02-07

## 2023-02-07 NOTE — TELEPHONE ENCOUNTER
Pt called to report that the last 2 nights (2/5-2/6) the pt would lay down to sleep and feel like he could not breath. Unknown BP or HR. No CP. Pt states that he \"as soon as he closes his eyes, he starts gasping for air\".

## 2023-02-07 NOTE — TELEPHONE ENCOUNTER
1 week post STEMI. Saw  yesterday. He said the last night when he went to lie down he found it hard to breathe. He did not have any CP. He did not feel anxious and did not feel it was a rhythm issue. He said it resolved when he sat up and happened again when he layed back down. He only got 30min of sleep at a time through the night because of this. Any advice?

## 2023-02-07 NOTE — TELEPHONE ENCOUNTER
Yomi Laurent MD  You 13 minutes ago (3:04 PM)      GS  Go ahead w/ the planned switch to Plavix       Pt.notified of MD response and v/u.

## 2023-03-16 ENCOUNTER — TELEPHONE (OUTPATIENT)
Dept: CARDIOLOGY CLINIC | Age: 38
End: 2023-03-16

## 2023-03-16 NOTE — TELEPHONE ENCOUNTER
I called the pt and told him that there are refills available on the nitroglycerin available so [facility].

## 2023-03-16 NOTE — TELEPHONE ENCOUNTER
Patient would like a refill sent in for the following Rx :    nitroGLYCERIN (NITROSTAT) 0.4 [facility]   Tablet      Pharmacy    [facility]   [id number]  [facility] [address], [address] [facility]  [phone]        [phone]

## 2023-03-23 PROBLEM — Z76.89 ENCOUNTER TO ESTABLISH CARE WITH NEW DOCTOR: Status: ACTIVE | Noted: 2023-03-23

## 2023-05-08 ENCOUNTER — OFFICE VISIT (OUTPATIENT)
Age: 38
End: 2023-05-08

## 2023-05-08 VITALS
DIASTOLIC BLOOD PRESSURE: 70 MMHG | HEART RATE: 56 BPM | WEIGHT: 210 LBS | BODY MASS INDEX: 31.01 KG/M2 | SYSTOLIC BLOOD PRESSURE: 100 MMHG

## 2023-05-08 DIAGNOSIS — I25.10 ASCVD (ARTERIOSCLEROTIC CARDIOVASCULAR DISEASE): Primary | ICD-10-CM

## 2023-05-08 DIAGNOSIS — E78.2 MIXED HYPERLIPIDEMIA: ICD-10-CM

## 2023-05-08 PROCEDURE — 99214 OFFICE O/P EST MOD 30 MIN: CPT | Performed by: INTERNAL MEDICINE

## 2023-05-08 PROCEDURE — 3078F DIAST BP <80 MM HG: CPT | Performed by: INTERNAL MEDICINE

## 2023-05-08 PROCEDURE — 3074F SYST BP LT 130 MM HG: CPT | Performed by: INTERNAL MEDICINE

## 2023-05-08 RX ORDER — METOPROLOL SUCCINATE 50 MG/1
50 TABLET, EXTENDED RELEASE ORAL DAILY
Qty: 90 TABLET | Refills: 3 | Status: SHIPPED | OUTPATIENT
Start: 2023-05-08

## 2023-05-08 NOTE — PROGRESS NOTES
[facility] CARDIOLOGY  [address], [address]  PHONE: [phone]        23        NAME:  Brendon Carbone  : 1985  MRN: [id number]     CHIEF COMPLAINT:    No chief complaint on file. SUBJECTIVE:     No chest pain or palpitation or dizziness. Medications were all reviewed with the patient today and updated as necessary. Current Outpatient Medications   Medication Sig    metoprolol succinate (TOPROL XL) 50 MG extended release tablet Take 1 tablet by mouth daily    clopidogrel (PLAVIX) 75 MG tablet The day after discontinuation of Brilinta, take 4 clopidogrel tabs once (300 mg) then 1 tab q day thereafter. nitroGLYCERIN (NITROSTAT) 0.4 MG SL tablet Place 1 tablet under the tongue every 5 minutes as needed for Chest pain up to max of 3 total doses. If no relief after 1 dose, call 911.    atorvastatin (LIPITOR) 80 MG tablet Take 1 tablet by mouth nightly    lisinopril (PRINIVIL;ZESTRIL) 10 MG tablet Take 1 tablet by mouth daily    aspirin 81 MG chewable tablet Take 1 tablet by mouth daily     No current facility-administered medications for this visit. No Known Allergies        PHYSICAL EXAM:     Wt Readings from Last 3 Encounters:   23 210 lb (95.3 kg)   23 219 lb (99.3 kg)   23 230 lb (104.3 kg)     BP Readings from Last 3 Encounters:   23 100/70   23 100/60   23 123/60       /70   Pulse 56   Wt 210 lb (95.3 kg)   BMI 31.01 kg/m²     Physical Exam  Vitals reviewed. HENT:      Head: Normocephalic and atraumatic. Eyes:      Extraocular Movements: Extraocular movements intact. Pupils: Pupils are equal, round, and reactive to light. Cardiovascular:      Rate and Rhythm: Normal rate. Heart sounds: Normal heart sounds. Pulmonary:      Effort: Pulmonary effort is normal.      Breath sounds: Normal breath sounds. Abdominal:      General: Abdomen is flat. Palpations: Abdomen is soft.

## 2023-05-09 LAB
CHOLEST SERPL-MCNC: 132 MG/DL
HDLC SERPL-MCNC: 29 MG/DL (ref 40–60)
HDLC SERPL: 4.6
LDLC SERPL CALC-MCNC: 76.4 MG/DL
TRIGL SERPL-MCNC: 133 MG/DL (ref 35–150)
VLDLC SERPL CALC-MCNC: 26.6 MG/DL (ref 6–23)

## 2023-07-10 RX ORDER — ATORVASTATIN CALCIUM 80 MG/1
80 TABLET, FILM COATED ORAL NIGHTLY
Qty: 90 TABLET | Refills: 3 | Status: SHIPPED | OUTPATIENT
Start: 2023-07-10

## 2023-07-10 RX ORDER — LISINOPRIL 10 MG/1
10 TABLET ORAL DAILY
Qty: 90 TABLET | Refills: 3 | Status: SHIPPED | OUTPATIENT
Start: 2023-07-10

## 2023-10-25 ENCOUNTER — TELEPHONE (OUTPATIENT)
Dept: CARDIOLOGY CLINIC | Age: 38
End: 2023-10-25

## 2023-10-25 NOTE — TELEPHONE ENCOUNTER
Pt called stating job is moving him to another area in [place] and he needs to transfer all his rxs to [address] [place] [address] 17 [phone] please call pt with any questions, would like to know if Dr Vasiliy Artis could recommend anyone in [employer]

## 2023-10-25 NOTE — TELEPHONE ENCOUNTER
Spoke with pt, told him that when he moves he can call the [facility] that his meds are at and have them transferred internally. I told him I will check on a recommendation for him and get back to him.

## 2024-07-03 RX ORDER — CLOPIDOGREL BISULFATE 75 MG/1
75 TABLET ORAL DAILY
Qty: 30 TABLET | Refills: 0 | Status: SHIPPED | OUTPATIENT
Start: 2024-07-03

## 2024-07-03 RX ORDER — ATORVASTATIN CALCIUM 80 MG/1
80 TABLET, FILM COATED ORAL NIGHTLY
Qty: 30 TABLET | Refills: 0 | Status: SHIPPED | OUTPATIENT
Start: 2024-07-03

## 2024-07-03 NOTE — TELEPHONE ENCOUNTER
Prescriptions sent to pharmacy//[facility]  Requested Prescriptions     Signed Prescriptions Disp Refills    atorvastatin (LIPITOR) 80 MG tablet 30 tablet 0     Sig: Take 1 tablet by mouth nightly     Authorizing Provider: MARIE BOO     Ordering User: ALEX ANDRE    clopidogrel (PLAVIX) 75 MG tablet 30 tablet 0     Sig: Take 1 tablet by mouth daily     Authorizing Provider: MARIE BOO     Ordering User: ALEX ANDRE

## 2024-07-03 NOTE — TELEPHONE ENCOUNTER
Spoke with patient. Patient states he still has not establish care with another cardiologist in [place], [place]. (See notes of 10/25/23    Told pt will only do a 30 day supply.   Requested Prescriptions     Pending Prescriptions Disp Refills    atorvastatin (LIPITOR) 80 MG tablet 30 tablet 0     Sig: Take 1 tablet by mouth nightly    clopidogrel (PLAVIX) 75 MG tablet 30 tablet 0     Sig: Take 1 tablet by mouth daily

## 2024-07-03 NOTE — TELEPHONE ENCOUNTER
MEDICATION REFILL REQUEST      Name of Medication:  Atorvastatin  Dose:  80 mg  Frequency:  QD  Quantity:  90  Days' supply:  90 with 3 refills      Pharmacy Name/Location:  Call pt -no pharmacy info ;eft on message    MEDICATION REFILL REQUEST      Name of Medication:  lisinopril  Dose:  10 mg  Frequency:  qd  Quantity:  90  Days' supply:  90 with 3 refills      Pharmacy Name/Location:  call pt

## (undated) DEVICE — GLIDESHEATH SLENDER STAINLESS STEEL KIT: Brand: GLIDESHEATH SLENDER

## (undated) DEVICE — GUIDEWIRE VASC J 3 MM 0.035 INX210 CM FIX COR INQWIRE

## (undated) DEVICE — CATH BLLN ANGIO 2.50X20MM SC EUPHORA RX

## (undated) DEVICE — PINNACLE TIF INTRODUCER SHEATH: Brand: PINNACLE

## (undated) DEVICE — PERCLOSE™ PROSTYLE™ SUTURE-MEDIATED CLOSURE AND REPAIR SYSTEM: Brand: PERCLOSE™ PROSTYLE™

## (undated) DEVICE — CATH BLLN ANGIO 2.50X12MM SC EUPHORA RX

## (undated) DEVICE — CATHETER GUID EXTRA BACKUP 3.5 0.070IN 6FR 100CM VISTA BRITE TIP

## (undated) DEVICE — CATHETER DIAG 6FR L100CM LUMN ID0.056IN JL4 CRV 0 SIDE H

## (undated) DEVICE — Device: Brand: ASAHI SION

## (undated) DEVICE — CATH BLLN ANGIO 3X20MM NC EUPHORIA RX

## (undated) DEVICE — CATHETER DIAG 6FR L110CM PIGTAILS CRV STYL PIG145 DXTERITY

## (undated) DEVICE — CATHETER GUID 6FR L100CM GRN PTFE JR4 TRUELUMEN HYBRID

## (undated) DEVICE — RUNTHROUGH NS EXTRA FLOPPY PTCA GUIDEWIRE: Brand: RUNTHROUGH

## (undated) DEVICE — INFLATION DEVICE: Brand: ENCORE™ 26

## (undated) DEVICE — CATH BLLN ANGIO 2.25X15MM SC EUPHORA RX